# Patient Record
Sex: MALE | Race: WHITE | URBAN - METROPOLITAN AREA
[De-identification: names, ages, dates, MRNs, and addresses within clinical notes are randomized per-mention and may not be internally consistent; named-entity substitution may affect disease eponyms.]

---
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
## 2018-01-16 DIAGNOSIS — F32.0 MAJOR DEPRESSIVE DISORDER, SINGLE EPISODE, MILD (H): ICD-10-CM

## 2018-01-16 NOTE — TELEPHONE ENCOUNTER
Not due for a refill.     FLUoxetine (PROZAC) 40 MG capsule was filled on 12/19/2017, qty 90 with 1 refills.

## 2018-03-30 ENCOUNTER — TELEPHONE (OUTPATIENT)
Dept: PEDIATRICS | Facility: CLINIC | Age: 18
End: 2018-03-30

## 2018-03-30 NOTE — TELEPHONE ENCOUNTER
Pt states that he will be graduating in couple of months & will be moving into an apartment soon. He is planning to get a dog as emotional support(depression - is taking prozac) before he moves in to an apt.     Will call back once he found the apt to move in to get a letter to keep a dog in apt as emotional support(if requested by apt authority). LOV 12/19/17.     Advised pt to notify us if he needs a letter.     Yakov, RN  Triage Nurse

## 2018-06-08 DIAGNOSIS — F32.0 MAJOR DEPRESSIVE DISORDER, SINGLE EPISODE, MILD (H): ICD-10-CM

## 2018-06-08 DIAGNOSIS — K21.9 GASTROESOPHAGEAL REFLUX DISEASE WITHOUT ESOPHAGITIS: ICD-10-CM

## 2018-06-08 RX ORDER — FLUOXETINE 40 MG/1
40 CAPSULE ORAL DAILY
Qty: 30 CAPSULE | Refills: 0 | Status: SHIPPED | OUTPATIENT
Start: 2018-06-08 | End: 2018-07-15

## 2018-06-08 NOTE — TELEPHONE ENCOUNTER
"Requested Prescriptions   Pending Prescriptions Disp Refills     ranitidine (ZANTAC) 150 MG tablet [Pharmacy Med Name: RANITIDINE 150 MG TABLET]    Last Written Prescription Date:  12/19/2017  Last Fill Quantity: 60,  # refills: 5   Last office visit: 12/19/2017 with prescribing provider:  Golden Pelletier     Future Office Visit:     60 tablet 5     Sig: TAKE 1 TABLET BY MOUTH TWICE A DAY    H2 Blockers Protocol Passed    6/8/2018  3:50 AM       Passed - Patient is age 12 or older       Passed - Recent (12 mo) or future (30 days) visit within the authorizing provider's specialty    Patient had office visit in the last 12 months or has a visit in the next 30 days with authorizing provider or within the authorizing provider's specialty.  See \"Patient Info\" tab in inbasket, or \"Choose Columns\" in Meds & Orders section of the refill encounter.            FLUoxetine (PROZAC) 40 MG capsule [Pharmacy Med Name: FLUOXETINE HCL 40 MG CAPSULE]    Last Written Prescription Date:  12/19/2017  Last Fill Quantity: 90,  # refills: 1   Last office visit: 12/19/2017 with prescribing provider:  Golden Pelletier     Future Office Visit:     90 capsule 1     Sig: TAKE ONE CAPSULE BY MOUTH EVERY DAY    SSRIs Protocol Failed    6/8/2018  3:50 AM       Failed - PHQ-9 score less than 5 in past 6 months    Please review last PHQ-9 score.     PHQ-9 SCORE 9/23/2016 9/19/2017 12/19/2017   Total Score MyChart - 10 (Moderate depression) 10 (Moderate depression)   Total Score 3 10 10     No flowsheet data found.             Passed - Patient is age 18 or older       Passed - Recent (6 mo) or future (30 days) visit within the authorizing provider's specialty    Patient had office visit in the last 6 months or has a visit in the next 30 days with authorizing provider or within the authorizing provider's specialty.  See \"Patient Info\" tab in inbasket, or \"Choose Columns\" in Meds & Orders section of the refill encounter.              "

## 2018-07-15 DIAGNOSIS — F32.0 MAJOR DEPRESSIVE DISORDER, SINGLE EPISODE, MILD (H): ICD-10-CM

## 2018-07-18 RX ORDER — FLUOXETINE 40 MG/1
40 CAPSULE ORAL DAILY
Qty: 30 CAPSULE | Refills: 0 | Status: SHIPPED | OUTPATIENT
Start: 2018-07-18 | End: 2018-07-25

## 2018-07-18 NOTE — TELEPHONE ENCOUNTER
PHQ-9 completed; patient states that he has moved to [place] and has yet to be able to find a provider down there to establish care with. Would like to know if a telephone visit sometime next week would work until he can get a new provider. Please see PHQ9 results and approve medication, if appropriate.   Yessi Davis, CMA

## 2018-07-18 NOTE — TELEPHONE ENCOUNTER
Phone visit would be fine, I do recommend a conversation as his PHQ9 score is increased.    Rx sent x 1 month.    Please call to help him schedule.

## 2018-07-18 NOTE — TELEPHONE ENCOUNTER
Routing refill request to provider for review/approval because:  Pt is overdue for an office visit.    Please advise.    Renetta Clark RN

## 2018-07-19 ASSESSMENT — PATIENT HEALTH QUESTIONNAIRE - PHQ9: SUM OF ALL RESPONSES TO PHQ QUESTIONS 1-9: 12

## 2018-07-25 ENCOUNTER — VIRTUAL VISIT (OUTPATIENT)
Dept: PEDIATRICS | Facility: CLINIC | Age: 18
End: 2018-07-25
Payer: COMMERCIAL

## 2018-07-25 DIAGNOSIS — F32.0 MAJOR DEPRESSIVE DISORDER, SINGLE EPISODE, MILD (H): Primary | ICD-10-CM

## 2018-07-25 PROCEDURE — 99441 ZZC PHYSICIAN TELEPHONE EVALUATION 5-10 MIN: CPT | Performed by: INTERNAL MEDICINE

## 2018-07-25 RX ORDER — FLUOXETINE 40 MG/1
40 CAPSULE ORAL DAILY
Qty: 30 CAPSULE | Refills: 5 | Status: SHIPPED | OUTPATIENT
Start: 2018-07-25 | End: 2019-01-30 | Stop reason: DRUGHIGH

## 2018-07-25 RX ORDER — BUPROPION HYDROCHLORIDE 150 MG/1
150 TABLET ORAL EVERY MORNING
Qty: 30 TABLET | Refills: 2 | Status: SHIPPED | OUTPATIENT
Start: 2018-07-25 | End: 2019-01-30 | Stop reason: DRUGHIGH

## 2018-07-25 NOTE — PATIENT INSTRUCTIONS
Continue with fluoxetine 40 mg once per day    Add Wellbutrin (bupriopion) 150 mg once per day    Please contact me if you develop side effects    Counseling visits would be beneficial    Next visit in 1-2 months (phone visit or set up a visit with a primary care doctor in your area)

## 2018-07-25 NOTE — MR AVS SNAPSHOT
After Visit Summary   7/25/2018    Johnathon Lara    MRN: [id number]           Patient Information     Date Of Birth          2000        Visit Information        Provider Department      7/25/2018 11:40 AM [facility]        Today's Diagnoses     Depression    -  1      Care Instructions    Continue with fluoxetine 40 mg once per day    Add Wellbutrin (bupriopion) 150 mg once per day    Please contact me if you develop side effects    Counseling visits would be beneficial    Next visit in 1-2 months (phone visit or set up a visit with a primary care doctor in your area)          Follow-ups after your visit        Your next 10 appointments already scheduled     Jul 25, 2018 11:40 AM CDT   Telephone Visit with Golden Pelletier MD   [facility] ([facility])    [address]  [address]  [place] [address] [phone]           Note: this is not an onsite visit; there is no need to come to the facility.              Who to contact     If you have questions or need follow up information about today's clinic visit or your schedule please contact [facility] directly at [phone].  Normal or non-critical lab and imaging results will be communicated to you by MyChart, letter or phone within 4 business days after the clinic has received the results. If you do not hear from us within 7 days, please contact the clinic through [employer] or phone. If you have a critical or abnormal lab result, we will notify you by phone as soon as possible.  Submit refill requests through [employer] or call your pharmacy and they will forward the refill request to us. Please allow 3 business days for your refill to be completed.          Additional Information About Your Visit        MyChart Information     [employer] gives you secure access to your electronic health record. If you see a primary care provider, you can also send messages to your  care team and make appointments. If you have questions, please call your primary care clinic.  If you do not have a primary care provider, please call [phone] and they will assist you.        Care EveryWhere ID     This is your Care EveryWhere ID. This could be used by other organizations to access your [place] medical records  [id number]         Blood Pressure from Last 3 Encounters:   12/19/17 120/60   11/24/17 118/72   11/25/16 116/68    Weight from Last 3 Encounters:   12/19/17 169 lb (76.7 kg) (70 %)*   11/24/17 166 lb (75.3 kg) (66 %)*   11/25/16 164 lb 7 oz (74.6 kg) (69 %)*     * Growth percentiles are based on [facility] 2-20 Years data.              Today, you had the following     No orders found for display         Today's Medication Changes          These changes are accurate as of 7/25/18 11:24 AM.  If you have any questions, ask your nurse or doctor.               Start taking these medicines.        Dose/Directions    buPROPion 150 MG 24 hr tablet   Commonly known as:  WELLBUTRIN XL   Used for:  Major depressive disorder, single episode, mild (H)   Started by:  Golden Pelletier MD        Dose:  150 mg   Take 1 tablet (150 mg) by mouth every morning   Quantity:  30 tablet   Refills:  2            Where to get your medicines      These medications were sent to [place] [address] [place] [address]     Phone:  [phone]     buPROPion 150 MG 24 hr tablet    FLUoxetine 40 MG capsule                Primary Care Provider Office Phone # Fax #    Golden Pelletier [phone] [address] [place] [address]        Equal Access to Services     [facility]: Hadii germaine Noyola, wamarquisda lureemaadaha, qaybta kaalmada chapito roberts. So [facility] [phone].    ATENCIÓN: Si habla español, tiene a smith disposición servicios gratuitos de asistencia lingüística. Llame al  [phone].    We comply with applicable federal civil rights laws and [place] laws. We do not discriminate on the basis of race, color, national origin, age, disability, sex, sexual orientation, or gender identity.            Thank you!     Thank you for choosing [facility]  for your care. Our goal is always to provide you with excellent care. Hearing back from our patients is one way we can continue to improve our services. Please take a few minutes to complete the written survey that you may receive in the mail after your visit with us. Thank you!             Your Updated Medication List - Protect others around you: Learn how to safely use, store and throw away your medicines at www.disposemymeds.org.          This list is accurate as of 7/25/18 11:24 AM.  Always use your most recent med list.                   Brand Name Dispense Instructions for use Diagnosis    buPROPion 150 MG 24 hr tablet    WELLBUTRIN XL    30 tablet    Take 1 tablet (150 mg) by mouth every morning    Major depressive disorder, single episode, mild (H)       FLUoxetine 40 MG capsule    PROzac    30 capsule    Take 1 capsule (40 mg) by mouth daily    Major depressive disorder, single episode, mild (H)       ranitidine 150 MG tablet    ZANTAC    60 tablet    TAKE 1 TABLET BY MOUTH TWICE A DAY    Gastroesophageal reflux disease without esophagitis

## 2018-07-25 NOTE — LETTER
My Depression Action Plan  Name: Johnathon Lara   Date of Birth 2000  Date: 7/25/2018    My doctor: Golden Pelletier   My clinic: [address]  [address]  [facility] [address] [phone]          GREEN    ZONE   Good Control    What it looks like:     Things are going generally well. You have normal up s and down s. You may even feel depressed from time to time, but bad moods usually last less than a day.   What you need to do:  1. Continue to care for yourself (see self care plan)  2. Check your depression survival kit and update it as needed  3. Follow your physician s recommendations including any medication.  4. Do not stop taking medication unless you consult with your physician first.           YELLOW         ZONE Getting Worse    What it looks like:     Depression is starting to interfere with your life.     It may be hard to get out of bed; you may be starting to isolate yourself from others.    Symptoms of depression are starting to last most all day and this has happened for several days.     You may have suicidal thoughts but they are not constant.   What you need to do:     1. Call your care team, your response to treatment will improve if you keep your care team informed of your progress. Yellow periods are signs an adjustment may need to be made.     2. Continue your self-care, even if you have to fake it!    3. Talk to someone in your support network    4. Open up your depression survival kit           RED    ZONE Medical Alert - Get Help    What it looks like:     Depression is seriously interfering with your life.     You may experience these or other symptoms: You can t get out of bed most days, can t work or engage in other necessary activities, you have trouble taking care of basic hygiene, or basic responsibilities, thoughts of suicide or death that will not go away, self-injurious behavior.     What you need to do:  1. Call your  care team and request a same-day appointment. If they are not available (weekends or after hours) call your local crisis line, emergency room or 911.            Depression Self Care Plan / Survival Kit    Self-Care for Depression  Here s the deal. Your body and mind are really not as separate as most people think.  What you do and think affects how you feel and how you feel influences what you do and think. This means if you do things that people who feel good do, it will help you feel better.  Sometimes this is all it takes.  There is also a place for medication and therapy depending on how severe your depression is, so be sure to consult with your medical provider and/ or Behavioral Health Consultant if your symptoms are worsening or not improving.     In order to better manage my stress, I will:    Exercise  Get some form of exercise, every day. This will help reduce pain and release endorphins, the  feel good  chemicals in your brain. This is almost as good as taking antidepressants!  This is not the same as joining a gym and then never going! (they count on that by the way ) It can be as simple as just going for a walk or doing some gardening, anything that will get you moving.      Hygiene   Maintain good hygiene (Get out of bed in the morning, Make your bed, Brush your teeth, Take a shower, and Get dressed like you were going to work, even if you are unemployed).  If your clothes don't fit try to get ones that do.    Diet  I will strive to eat foods that are good for me, drink plenty of water, and avoid excessive sugar, caffeine, alcohol, and other mood-altering substances.  Some foods that are helpful in depression are: complex carbohydrates, B vitamins, flaxseed, fish or fish oil, fresh fruits and vegetables.    Psychotherapy  I agree to participate in Individual Therapy (if recommended).    Medication  If prescribed medications, I agree to take them.  Missing doses can result in serious side effects.  I  understand that drinking alcohol, or other illicit drug use, may cause potential side effects.  I will not stop my medication abruptly without first discussing it with my provider.    Staying Connected With Others  I will stay in touch with my friends, family members, and my primary care provider/team.    Use your imagination  Be creative.  We all have a creative side; it doesn t matter if it s oil painting, sand castles, or mud pies! This will also kick up the endorphins.    Witness Beauty  (AKA stop and smell the roses) Take a look outside, even in mid-winter. Notice colors, textures. Watch the squirrels and birds.     Service to others  Be of service to others.  There is always someone else in need.  By helping others we can  get out of ourselves  and remember the really important things.  This also provides opportunities for practicing all the other parts of the program.    Humor  Laugh and be silly!  Adjust your TV habits for less news and crime-drama and more comedy.    Control your stress  Try breathing deep, massage therapy, biofeedback, and meditation. Find time to relax each day.     My support system    Clinic Contact:  Phone number:    Contact 1:  Phone number:    Contact 2:  Phone number:    Taoist/:  Phone number:    Therapist:  Phone number:    Local crisis center:    Phone number:    Other community support:  Phone number:

## 2018-07-25 NOTE — PROGRESS NOTES
"Johnathon Lara is a 18 year old male who is being evaluated via a telephone visit.      The patient has been notified of following:     \"This telephone visit will be conducted via a call between you and your physician/provider. We have found that certain health care needs can be provided without the need for a physical exam.  This service lets us provide the care you need with a short phone conversation.  If a prescription is necessary we can send it directly to your pharmacy.  If lab work is needed we can place an order for that and you can then stop by our lab to have the test done at a later time.    We will bill your insurance company for this service.  Please check with your medical insurance if this type of visit is covered. You may be responsible for the cost of this type of visit if insurance coverage is denied.  The typical cost is $30 (10min), $59 (11-20min) and $85 (21-30min).  Most often these visits are shorter than 10 minutes.    If during the course of the call the physician/provider feels a telephone visit is not appropriate, you will not be charged for this service.\"     Consent has been obtained for this service by care team member: yes.   See the scanned image in the medical record.    Johnathon Lara complains of  Telephone (DEPRESSION FOLLOW UP)    I have reviewed and updated the patient's Past Medical History, Social History, Family History and Medication List.    ALLERGIES  No known drug allergies    Shanda Joshi CMA   (MA signature)    Additional provider notes:   Discussed sx w/ pt.  Is taking fluoxetine 40 mg.   Sx are worsening.  PHQ-9 SCORE 9/19/2017 12/19/2017 7/18/2018   Total Score MyChart 10 (Moderate depression) 10 (Moderate depression) -   Total Score 10 10 12     He is unsure of any causes for worsening sx.  Has not set up counseling.     Discussed option of Wellbutrin. Is willing to try adding Wellbutrin.    He is also considering getting a dog to help with emotional " support.    Assessment/Plan:    ICD-10-CM    1. Depression F32.0 buPROPion (WELLBUTRIN XL) 150 MG 24 hr tablet     FLUoxetine (PROZAC) 40 MG capsule        Patient Instructions   Continue with fluoxetine 40 mg once per day    Add Wellbutrin (bupriopion) 150 mg once per day    Please contact me if you develop side effects    Counseling visits would be beneficial    Next visit in 1-2 months (phone visit or set up a visit with a primary care doctor in your area)   I have reviewed the note as documented above.  This accurately captures the substance of my conversation with the Johnathon Lara.    Total time of call between patient and provider was 8 minutes

## 2018-08-29 DIAGNOSIS — F32.0 MAJOR DEPRESSIVE DISORDER, SINGLE EPISODE, MILD (H): ICD-10-CM

## 2018-08-30 NOTE — TELEPHONE ENCOUNTER
"Requested Prescriptions   Pending Prescriptions Disp Refills     FLUoxetine (PROZAC) 40 MG capsule [Pharmacy Med Name: FLUOXETINE HCL 40 MG CAPSULE]  Last Written Prescription Date:  07/25/2018  Last Fill Quantity: 30,  # refills: 5   Last office visit: 12/19/2017 with prescribing provider:  ISIAH   Future Office Visit:     30 capsule 0     Sig: TAKE 1 CAPSULE BY MOUTH EVERY DAY    SSRIs Protocol Failed    8/29/2018  5:49 PM       Failed - PHQ-9 score less than 5 in past 6 months    Please review last PHQ-9 score.   PHQ-9 SCORE 9/19/2017 12/19/2017 7/18/2018   Total Score MyChart 10 (Moderate depression) 10 (Moderate depression) -   Total Score 10 10 12     No flowsheet data found.             Passed - Patient is age 18 or older       Passed - Recent (6 mo) or future (30 days) visit within the authorizing provider's specialty    Patient had office visit in the last 6 months or has a visit in the next 30 days with authorizing provider or within the authorizing provider's specialty.  See \"Patient Info\" tab in inbasket, or \"Choose Columns\" in Meds & Orders section of the refill encounter.              "

## 2018-08-31 RX ORDER — FLUOXETINE 40 MG/1
CAPSULE ORAL
Qty: 30 CAPSULE | Refills: 0 | Status: SHIPPED | OUTPATIENT
Start: 2018-08-31 | End: 2019-01-30

## 2018-08-31 NOTE — TELEPHONE ENCOUNTER
OK to refill. Should have phone visit next month if did not establish with new PCP in his new location.

## 2018-10-19 ENCOUNTER — VIRTUAL VISIT (OUTPATIENT)
Dept: PEDIATRICS | Facility: CLINIC | Age: 18
End: 2018-10-19
Payer: COMMERCIAL

## 2018-10-19 ENCOUNTER — TELEPHONE (OUTPATIENT)
Dept: PEDIATRICS | Facility: CLINIC | Age: 18
End: 2018-10-19

## 2018-10-19 DIAGNOSIS — F32.0 MAJOR DEPRESSIVE DISORDER, SINGLE EPISODE, MILD (H): ICD-10-CM

## 2018-10-19 PROCEDURE — 99441 ZZC PHYSICIAN TELEPHONE EVALUATION 5-10 MIN: CPT | Performed by: INTERNAL MEDICINE

## 2018-10-19 RX ORDER — BUPROPION HYDROCHLORIDE 300 MG/1
300 TABLET ORAL EVERY MORNING
Qty: 30 TABLET | Refills: 2 | Status: SHIPPED | OUTPATIENT
Start: 2018-10-19 | End: 2019-01-25

## 2018-10-19 RX ORDER — BUPROPION HYDROCHLORIDE 150 MG/1
TABLET ORAL
Qty: 30 TABLET | Refills: 2 | Status: CANCELLED | OUTPATIENT
Start: 2018-10-19

## 2018-10-19 NOTE — TELEPHONE ENCOUNTER
Pt LM at 2:50 pm requesting a call back at [phone]. Has questions on his fluoxetine & bupropion.     Called pt back. Pt has been feeling tired(more than usual) & sleepiness. Getting 10-12 hrs of sleep at night, but still feeling tired & wants to go back to sleep. Denies any other concerning sx's. Denies suicidal thoughts.     Pt is due for a f/u. So, scheduled a phone visit with  for this afternoon.    Notes from phone visit with  on 7/25/18:  Continue with fluoxetine 40 mg once per day  Add Wellbutrin (bupriopion) 150 mg once per day  Please contact me if you develop side effects  Counseling visits would be beneficial     Next visit in 1-2 months (phone visit or set up a visit with a primary care doctor in your area)     Yakov, RN  Triage Nurse

## 2018-10-19 NOTE — MR AVS SNAPSHOT
After Visit Summary   10/19/2018    Johnathon Lara    MRN: [id number]           Patient Information     Date Of Birth          2000        Visit Information        Provider Department      10/19/2018 2:20 PM Golden Pelletier MD [facility]        Today's Diagnoses     Depression           Follow-ups after your visit        Who to contact     If you have questions or need follow up information about today's clinic visit or your schedule please contact [facility] directly at [phone].  Normal or non-critical lab and imaging results will be communicated to you by MyChart, letter or phone within 4 business days after the clinic has received the results. If you do not hear from us within 7 days, please contact the clinic through [employer] or phone. If you have a critical or abnormal lab result, we will notify you by phone as soon as possible.  Submit refill requests through [employer] or call your pharmacy and they will forward the refill request to us. Please allow 3 business days for your refill to be completed.          Additional Information About Your Visit        MyChart Information     [employer] gives you secure access to your electronic health record. If you see a primary care provider, you can also send messages to your care team and make appointments. If you have questions, please call your primary care clinic.  If you do not have a primary care provider, please call [phone] and they will assist you.        Care EveryWhere ID     This is your Care EveryWhere ID. This could be used by other organizations to access your [place] medical records  [id number]         Blood Pressure from Last 3 Encounters:   12/19/17 120/60   11/24/17 118/72   11/25/16 116/68    Weight from Last 3 Encounters:   12/19/17 169 lb (76.7 kg) (70 %)*   11/24/17 166 lb (75.3 kg) (66 %)*   11/25/16 164 lb 7 oz (74.6 kg) (69 %)*     * Growth percentiles are based on CDC 2-20 Years data.               Today, you had the following     No orders found for display         Today's Medication Changes          These changes are accurate as of 10/19/18  3:59 PM.  If you have any questions, ask your nurse or doctor.               These medicines have changed or have updated prescriptions.        Dose/Directions    * buPROPion 150 MG 24 hr tablet   Commonly known as:  WELLBUTRIN XL   This may have changed:  Another medication with the same name was added. Make sure you understand how and when to take each.   Used for:  Major depressive disorder, single episode, mild (H)   Changed by:  Golden Pelletier MD        Dose:  150 mg   Take 1 tablet (150 mg) by mouth every morning   Quantity:  30 tablet   Refills:  2       * buPROPion 300 MG 24 hr tablet   Commonly known as:  WELLBUTRIN XL   This may have changed:  You were already taking a medication with the same name, and this prescription was added. Make sure you understand how and when to take each.   Used for:  Major depressive disorder, single episode, mild (H)   Changed by:  Golden Pelletier MD        Dose:  300 mg   Take 1 tablet (300 mg) by mouth every morning   Quantity:  30 tablet   Refills:  2       * FLUoxetine 40 MG capsule   Commonly known as:  PROzac   This may have changed:  Another medication with the same name was added. Make sure you understand how and when to take each.   Used for:  Major depressive disorder, single episode, mild (H)   Changed by:  Golden Pelletier MD        Dose:  40 mg   Take 1 capsule (40 mg) by mouth daily   Quantity:  30 capsule   Refills:  5       * FLUoxetine 40 MG capsule   Commonly known as:  PROzac   This may have changed:  Another medication with the same name was added. Make sure you understand how and when to take each.   Used for:  Major depressive disorder, single episode, mild (H)   Changed by:  Golden Pelletier MD        TAKE 1 CAPSULE BY MOUTH EVERY DAY   Quantity:  30 capsule   Refills:  0       * FLUoxetine 20 MG capsule    Commonly known as:  PROzac   This may have changed:  You were already taking a medication with the same name, and this prescription was added. Make sure you understand how and when to take each.   Used for:  Major depressive disorder, single episode, mild (H)   Changed by:  Golden Pelletier MD        Dose:  20 mg   Take 1 capsule (20 mg) by mouth daily   Quantity:  30 capsule   Refills:  2       * Notice:  This list has 5 medication(s) that are the same as other medications prescribed for you. Read the directions carefully, and ask your doctor or other care provider to review them with you.         Where to get your medicines      These medications were sent to [place] [address] [place] [facility] [address]  [address] [address] [facility] [address]     Phone:  [phone]     buPROPion 300 MG 24 hr tablet    FLUoxetine 20 MG capsule                Primary Care Provider Office Phone # Fax #    Golden Pelletier [phone] [address] [place] [address]        Equal Access to Services     [facility]: Hadii aad ku hadasho Soomaali, waaxda luqadaha, qaybta kaalmada adeegyada, waxay luannein haygrupo muhammad . So [facility] [place] [facility] [phone].    ATENCIÓN: Si habla español, tiene a smith disposición servicios gratuitos de asistencia lingüística. [facility] [phone].    We comply with applicable federal civil rights laws and [place] laws. We do not discriminate on the basis of race, color, national origin, age, disability, sex, sexual orientation, or gender identity.            Thank you!     Thank you for choosing [facility]  for your care. Our goal is always to provide you with excellent care. Hearing back from our patients is one way we can continue to improve our services. Please take a few minutes to complete the written survey that you may receive in the mail after your visit with us. Thank you!             Your Updated Medication List - Protect others around  you: Learn how to safely use, store and throw away your medicines at www.disposemymeds.org.          This list is accurate as of 10/19/18  3:59 PM.  Always use your most recent med list.                   Brand Name Dispense Instructions for use Diagnosis    * buPROPion 150 MG 24 hr tablet    WELLBUTRIN XL    30 tablet    Take 1 tablet (150 mg) by mouth every morning    Major depressive disorder, single episode, mild (H)       * buPROPion 300 MG 24 hr tablet    WELLBUTRIN XL    30 tablet    Take 1 tablet (300 mg) by mouth every morning    Major depressive disorder, single episode, mild (H)       * FLUoxetine 40 MG capsule    PROzac    30 capsule    Take 1 capsule (40 mg) by mouth daily    Major depressive disorder, single episode, mild (H)       * FLUoxetine 40 MG capsule    PROzac    30 capsule    TAKE 1 CAPSULE BY MOUTH EVERY DAY    Major depressive disorder, single episode, mild (H)       * FLUoxetine 20 MG capsule    PROzac    30 capsule    Take 1 capsule (20 mg) by mouth daily    Major depressive disorder, single episode, mild (H)       ranitidine 150 MG tablet    ZANTAC    60 tablet    TAKE 1 TABLET BY MOUTH TWICE A DAY    Gastroesophageal reflux disease without esophagitis       * Notice:  This list has 5 medication(s) that are the same as other medications prescribed for you. Read the directions carefully, and ask your doctor or other care provider to review them with you.

## 2018-10-19 NOTE — PROGRESS NOTES
SUBJECTIVE:   Johnathon Lara is a 18 year old male who presents to clinic today for the following health issues:      Followup of  Depression - treating with wellbutrin and fluoxetine    Taking Medication as prescribed: yes    Side Effects:  Fatigue sx - ongoing for the past few years, worse in the past several months    Medication Helping S ymptoms: yes overall is helping     Reviewed med history.  Has been rx fluoxetine 20 mg for the past several years.  TSH, CBC checked in 2015 (while taking fluoxetine), normal results.  Fluoxetine dose increased last December.  Questions if his meds are possibly a cause for his fatigue sx.     Problem list and histories reviewed & adjusted, as indicated.    Assessment/Plan:    ICD-10-CM    1. Depression F32.0 buPROPion (WELLBUTRIN XL) 300 MG 24 hr tablet     FLUoxetine (PROZAC) 20 MG capsule     Discussed options.  Fatigue may be a side effect - more likely fluoxetine than bupropion.  Will try altering doses - increase bupropion to 300 mg daily, decrease fluoxetine to 20 mg daily.  After 1 month, see how this affects his fatigue sx.   If partial improvement, consider discontinue fluoxetine and potentially stay with bupropion alone or could add Effexor.     Total phone time: 7 minutes 45 seconds

## 2018-10-19 NOTE — TELEPHONE ENCOUNTER
"Requested Prescriptions   Pending Prescriptions Disp Refills     buPROPion (WELLBUTRIN XL) 150 MG 24 hr tablet [Pharmacy Med Name: BUPROPION HCL  MG TABLET]    Last Written Prescription Date:  7/25/2018  Last Fill Quantity: 30,  # refills: 2   Last office visit: 7/25/2018 with prescribing provider:  Golden Pelletier     Future Office Visit:     30 tablet 2     Sig: TAKE 1 TABLET (150 MG) BY MOUTH EVERY MORNING    SSRIs Protocol Failed    10/19/2018 10:58 AM       Failed - PHQ-9 score less than 5 in past 6 months    Please review last PHQ-9 score.     PHQ-9 SCORE 9/19/2017 12/19/2017 7/18/2018   Total Score MyChart 10 (Moderate depression) 10 (Moderate depression) -   Total Score 10 10 12     No flowsheet data found.             Passed - Medication is Bupropion    If the medication is Bupropion (Wellbutrin), and the patient is taking for smoking cessation; OK to refill.         Passed - Patient is age 18 or older       Passed - Recent (6 mo) or future (30 days) visit within the authorizing provider's specialty    Patient had office visit in the last 6 months or has a visit in the next 30 days with authorizing provider or within the authorizing provider's specialty.  See \"Patient Info\" tab in inbasket, or \"Choose Columns\" in Meds & Orders section of the refill encounter.              "

## 2018-10-22 DIAGNOSIS — F32.0 MAJOR DEPRESSIVE DISORDER, SINGLE EPISODE, MILD (H): ICD-10-CM

## 2018-10-22 NOTE — TELEPHONE ENCOUNTER
Not due for a refill.     buPROPion (WELLBUTRIN XL) 300 MG 24 hr tablet was filled on 10/19/2018, qty 30 with 2 refills.

## 2018-10-23 RX ORDER — BUPROPION HYDROCHLORIDE 150 MG/1
TABLET ORAL
Qty: 30 TABLET | Refills: 2 | OUTPATIENT
Start: 2018-10-23

## 2019-01-25 DIAGNOSIS — F32.0 MAJOR DEPRESSIVE DISORDER, SINGLE EPISODE, MILD (H): ICD-10-CM

## 2019-01-25 NOTE — TELEPHONE ENCOUNTER
"Requested Prescriptions   Pending Prescriptions Disp Refills     FLUoxetine (PROZAC) 20 MG capsule [Pharmacy Med Name: FLUOXETINE HCL 20 MG CAPSULE]    Last Written Prescription Date:  10/19/2018  Last Fill Quantity: 30,  # refills: 2  Last office visit: 10/19/2018 with prescribing provider:  Golden Pelletier     Future Office Visit:     30 capsule 2     Sig: TAKE 1 CAPSULE BY MOUTH EVERY DAY    SSRIs Protocol Failed - 1/25/2019  1:57 AM       Failed - PHQ-9 score less than 5 in past 6 months    Please review last PHQ-9 score.     PHQ-9 SCORE 9/19/2017 12/19/2017 7/18/2018   PHQ-9 Total Score MyChart 10 (Moderate depression) 10 (Moderate depression) -   PHQ-9 Total Score 10 10 12     No flowsheet data found.             Passed - Medication is Bupropion    If the medication is Bupropion (Wellbutrin), and the patient is taking for smoking cessation; OK to refill.         Passed - Medication is active on med list       Passed - Patient is age 18 or older       Passed - Recent (6 mo) or future (30 days) visit within the authorizing provider's specialty    Patient had office visit in the last 6 months or has a visit in the next 30 days with authorizing provider or within the authorizing provider's specialty.  See \"Patient Info\" tab in inbasket, or \"Choose Columns\" in Meds & Orders section of the refill encounter.            buPROPion (WELLBUTRIN XL) 300 MG 24 hr tablet [Pharmacy Med Name: BUPROPION HCL  MG TABLET]    Last Written Prescription Date:  10/19/2018  Last Fill Quantity: 30,  # refills: 2   Last office visit: 10/19/2018 with prescribing provider:  Golden Pelletier     Future Office Visit:     30 tablet 2     Sig: TAKE 1 TABLET (300 MG) BY MOUTH EVERY MORNING    SSRIs Protocol Failed - 1/25/2019  1:57 AM       Failed - PHQ-9 score less than 5 in past 6 months    Please review last PHQ-9 score.     PHQ-9 SCORE 9/19/2017 12/19/2017 7/18/2018   PHQ-9 Total Score MyChart 10 (Moderate depression) 10 (Moderate " "depression) -   PHQ-9 Total Score 10 10 12     No flowsheet data found.             Passed - Medication is Bupropion    If the medication is Bupropion (Wellbutrin), and the patient is taking for smoking cessation; OK to refill.         Passed - Medication is active on med list       Passed - Patient is age 18 or older       Passed - Recent (6 mo) or future (30 days) visit within the authorizing provider's specialty    Patient had office visit in the last 6 months or has a visit in the next 30 days with authorizing provider or within the authorizing provider's specialty.  See \"Patient Info\" tab in inbasket, or \"Choose Columns\" in Meds & Orders section of the refill encounter.              "

## 2019-01-28 RX ORDER — BUPROPION HYDROCHLORIDE 300 MG/1
300 TABLET ORAL EVERY MORNING
Qty: 30 TABLET | Refills: 0 | Status: SHIPPED | OUTPATIENT
Start: 2019-01-28

## 2019-01-28 NOTE — TELEPHONE ENCOUNTER
Medication is being filled for 1 time refill only due to:  PHQ and ALY due. With visit reminder information sent out.     Routing to nurse pool to have assessments/reminder sent out.     Rosana Thomas RN Flex

## 2019-01-31 NOTE — TELEPHONE ENCOUNTER
Called and left message with clinic number for patient to contact clinic. Consulted with RN Joelle who recommends patient come in for office visit. If patient is unable to come in for office visit to schedule either an E visit or Phone visit. If possible please complete PHQ-9 and ALY per notes below. Laxmi Diaz MA

## 2019-02-01 NOTE — TELEPHONE ENCOUNTER
The pt called back and he is aware of his need for this appointment and he will call back to reschedule at a later date.   Kera Burns on 2/1/2019 at 12:28 PM

## 2020-02-16 ENCOUNTER — HEALTH MAINTENANCE LETTER (OUTPATIENT)
Age: 20
End: 2020-02-16

## 2020-11-22 ENCOUNTER — HEALTH MAINTENANCE LETTER (OUTPATIENT)
Age: 20
End: 2020-11-22

## 2021-04-04 ENCOUNTER — HEALTH MAINTENANCE LETTER (OUTPATIENT)
Age: 21
End: 2021-04-04

## 2021-09-19 ENCOUNTER — HEALTH MAINTENANCE LETTER (OUTPATIENT)
Age: 21
End: 2021-09-19

## 2022-04-30 ENCOUNTER — HEALTH MAINTENANCE LETTER (OUTPATIENT)
Age: 22
End: 2022-04-30

## 2022-11-20 ENCOUNTER — HEALTH MAINTENANCE LETTER (OUTPATIENT)
Age: 22
End: 2022-11-20

## 2023-06-02 ENCOUNTER — HEALTH MAINTENANCE LETTER (OUTPATIENT)
Age: 23
End: 2023-06-02